# Patient Record
Sex: MALE | Race: OTHER | Employment: FULL TIME | URBAN - METROPOLITAN AREA
[De-identification: names, ages, dates, MRNs, and addresses within clinical notes are randomized per-mention and may not be internally consistent; named-entity substitution may affect disease eponyms.]

---

## 2019-09-05 ENCOUNTER — HOSPITAL ENCOUNTER (EMERGENCY)
Age: 39
Discharge: HOME | End: 2019-09-06
Payer: COMMERCIAL

## 2019-09-05 DIAGNOSIS — R03.0 ELEVATED BLOOD PRESSURE READING: Primary | ICD-10-CM

## 2019-09-05 PROCEDURE — 99281 EMR DPT VST MAYX REQ PHY/QHP: CPT

## 2019-09-06 VITALS
DIASTOLIC BLOOD PRESSURE: 104 MMHG | WEIGHT: 190 LBS | TEMPERATURE: 98.2 F | RESPIRATION RATE: 15 BRPM | SYSTOLIC BLOOD PRESSURE: 156 MMHG | HEART RATE: 65 BPM | HEIGHT: 67 IN | BODY MASS INDEX: 29.82 KG/M2 | OXYGEN SATURATION: 100 %

## 2019-09-06 NOTE — ED NOTES
Pt states that he needs a primary physician and if given one he can be discharged.      Mann Beth RN  09/05/19 9239

## 2019-09-06 NOTE — ED TRIAGE NOTES
Pt to ed with a friend who was requesting a test, pt sts he just wanted his health to be checked no complaint. It was explained to pt we are for emergency not general check ups.  Pt triaged using language line Kp Mendoza

## 2020-10-05 ENCOUNTER — HOSPITAL ENCOUNTER (EMERGENCY)
Age: 40
Discharge: HOME | End: 2020-10-05
Payer: COMMERCIAL

## 2020-10-05 ENCOUNTER — APPOINTMENT (OUTPATIENT)
Dept: CT IMAGING | Age: 40
End: 2020-10-05
Payer: COMMERCIAL

## 2020-10-05 VITALS
SYSTOLIC BLOOD PRESSURE: 135 MMHG | HEART RATE: 87 BPM | OXYGEN SATURATION: 98 % | TEMPERATURE: 98 F | RESPIRATION RATE: 16 BRPM | DIASTOLIC BLOOD PRESSURE: 87 MMHG

## 2020-10-05 PROCEDURE — 70450 CT HEAD/BRAIN W/O DYE: CPT

## 2020-10-05 PROCEDURE — 90471 IMMUNIZATION ADMIN: CPT | Performed by: PHYSICIAN ASSISTANT

## 2020-10-05 PROCEDURE — 4500000027

## 2020-10-05 PROCEDURE — 2500000003 HC RX 250 WO HCPCS: Performed by: PHYSICIAN ASSISTANT

## 2020-10-05 PROCEDURE — 90715 TDAP VACCINE 7 YRS/> IM: CPT | Performed by: PHYSICIAN ASSISTANT

## 2020-10-05 PROCEDURE — 6360000002 HC RX W HCPCS: Performed by: PHYSICIAN ASSISTANT

## 2020-10-05 PROCEDURE — 72125 CT NECK SPINE W/O DYE: CPT

## 2020-10-05 PROCEDURE — 6370000000 HC RX 637 (ALT 250 FOR IP): Performed by: PHYSICIAN ASSISTANT

## 2020-10-05 PROCEDURE — 99283 EMERGENCY DEPT VISIT LOW MDM: CPT

## 2020-10-05 RX ORDER — DIAPER,BRIEF,INFANT-TODD,DISP
EACH MISCELLANEOUS ONCE
Status: COMPLETED | OUTPATIENT
Start: 2020-10-05 | End: 2020-10-05

## 2020-10-05 RX ORDER — LIDOCAINE HYDROCHLORIDE 20 MG/ML
5 INJECTION, SOLUTION INFILTRATION; PERINEURAL ONCE
Status: COMPLETED | OUTPATIENT
Start: 2020-10-05 | End: 2020-10-05

## 2020-10-05 RX ADMIN — LIDOCAINE HYDROCHLORIDE 5 ML: 20 INJECTION, SOLUTION INFILTRATION; PERINEURAL at 17:50

## 2020-10-05 RX ADMIN — TETANUS TOXOID, REDUCED DIPHTHERIA TOXOID AND ACELLULAR PERTUSSIS VACCINE, ADSORBED 0.5 ML: 5; 2.5; 8; 8; 2.5 SUSPENSION INTRAMUSCULAR at 17:49

## 2020-10-05 RX ADMIN — BACITRACIN ZINC: 500 OINTMENT TOPICAL at 18:16

## 2020-10-05 ASSESSMENT — PAIN DESCRIPTION - PAIN TYPE: TYPE: ACUTE PAIN

## 2020-10-05 ASSESSMENT — PAIN SCALES - GENERAL
PAINLEVEL_OUTOF10: 6
PAINLEVEL_OUTOF10: 6

## 2020-10-05 NOTE — ED PROVIDER NOTES
As tele physician-in-triage, I performed a medical screening history on this patient with the use of a live telehealth program. Nursing staff may have been used as a surrogate for any part of the physical exam which may be unable to be performed by myself. HISTORY OF PRESENT ILLNESS  Michael Zamora Nurse is a 37 y.o. male was on the ground fixing something when the headboard fell onto his head. No anticoagulants. No neck pain. PHYSICAL EXAM  There were no vitals taken for this visit. On exam, the patient appears well-hydrated, well-nourished, and in no acute distress. Speech is clear. Breathing is unlabored. Mental status is normal. The patient has normal gait, moves all extremities, and is without facial droop. Comment: Please note this report has been produced using speech recognition software and may contain errors related to that system including errors in grammar, punctuation, and spelling, as well as words and phrases that may be inappropriate. If there are any questions or concerns please feel free to contact the dictating provider for clarification.       Danielle Napier DO  10/05/20 5054

## 2020-10-05 NOTE — ED PROVIDER NOTES
eMERGENCY dEPARTMENT eNCOUnter      PCP: No primary care provider on file. CHIEF COMPLAINT    Chief Complaint   Patient presents with    Head Injury     3-4 cm laceration to top of head, head of bed fell onto head from leaning against wall and pt sitting on floor, denies head injury, changes to vision, or dizziness, pt drove to ed      Pt was not seen by physician    Patient's primary language is [place] but he does speak enough English to get through [facility] plan of care and testing. Did offer him language line  which she declined at this time. I do believe we had sufficient communication. Did use  #[id number] to re-communicate with patient and to discuss head injury signs and symptoms, laceration repair    HPI    Michael Johns is a 37 y.o. male who presents with head injury and scalp laceration that occurred this afternoon. Patient was attempting to put a bed frame together. Was loosening a piece when the head of the bed frame fell down onto patient's head while he was on the ground. No loss of consciousness. He denies dizziness nausea vomiting acute vision changes. Denies neck pain or any other injuries. He is uncertain about his tetanus status. REVIEW OF SYSTEMS    Constitutional:  Denies fever, chills, weight loss or weakness   HENT:  Denies sore throat or ear pain   Cardiovascular:  Denies chest pain, palpitations   Respiratory:  Denies cough or shortness of breath    GI:  Denies abdominal pain, nausea, vomiting, or diarrhea  :  Denies any urinary symptoms   Musculoskeletal:  Denies back pain,   Skin:  Denies rash  Neurologic:  Denies focal weakness or sensory changes   Endocrine:  Denies polyuria or polydypsia   Lymphatic:  Denies swollen glands     All other review of systems are negative  See HPI and nursing notes for additional information     PAST MEDICAL AND SURGICAL HISTORY    History reviewed. No pertinent past medical history. History reviewed.  No pertinent surgical history. CURRENT MEDICATIONS        ALLERGIES    No Known Allergies    SOCIAL AND FAMILY HISTORY    Social History     Socioeconomic History    Marital status:      Spouse name: None    Number of children: None    Years of education: None    Highest education level: None   Occupational History    None   Social Needs    Financial resource strain: None    Food insecurity     Worry: None     Inability: None    Transportation needs     Medical: None     Non-medical: None   Tobacco Use    Smoking status: Never Smoker    Smokeless tobacco: Never Used   Substance and Sexual Activity    Alcohol use: Not Currently     Comment: occ    Drug use: Never    Sexual activity: None   Lifestyle    Physical activity     Days per week: None     Minutes per session: None    Stress: None   Relationships    Social connections     Talks on phone: None     Gets together: None     Attends [employer] service: None     Active member of club or organization: None     Attends meetings of clubs or organizations: None     Relationship status: None    Intimate partner violence     Fear of current or ex partner: None     Emotionally abused: None     Physically abused: None     Forced sexual activity: None   Other Topics Concern    None   Social History Narrative    None     History reviewed. No pertinent family history. PHYSICAL EXAM    VITAL SIGNS: BP (!) 148/98   Pulse 101   Temp 98 °F (36.7 °C) (Oral)   Resp 16   SpO2 98%    Constitutional:  Well developed, Well nourished  HENT:  Normocephalic,  PERRL. EOMI. Sclera clear. Conjunctiva normal, No discharge. Neck/Lymphatics: supple, no JVD, no swollen nodes  No midline cervical tenderness to palpation  Cardiovascular:  Normal heart rate, Normal rhythm, No murmurs  Respiratory:  Nonlabored breathing. Normal breath sounds, No wheezing  Abdomen: Bowel sounds normal, Soft, No tenderness, no masses.   Musculoskeletal: No edema, No tenderness, No cyanosis  Integument:  Warm, Dry  5 cm scalp laceration  Neurologic:  Alert & oriented , No focal deficits noted. Cranial nerves II through XII grossly intact. Finger to nose intact, rapid alternating movements intact. Normal gross motor coordination & motor strength bilateral upper and lower extremities. Sensation intact. Psychiatric:  Affect normal, Mood normal.       RADIOLOGY    Ct Head Wo Contrast    Result Date: 10/5/2020  EXAMINATION: CT OF THE HEAD WITHOUT CONTRAST  10/5/2020 4:33 pm TECHNIQUE: CT of the head was performed without the administration of intravenous contrast. Dose modulation, iterative reconstruction, and/or weight based adjustment of the mA/kV was utilized to reduce the radiation dose to as low as reasonably achievable. COMPARISON: None. HISTORY: ORDERING SYSTEM PROVIDED HISTORY: struck head TECHNOLOGIST PROVIDED HISTORY: Has a \"code stroke\" or \"stroke alert\" been called? ->No Reason for exam:->struck head Reason for Exam: struck head FINDINGS: BRAIN/VENTRICLES: There is no acute intracranial hemorrhage, mass effect or midline shift. No abnormal extra-axial fluid collection. The gray-white differentiation is maintained without evidence of an acute infarct. There is no evidence of hydrocephalus. ORBITS: The visualized portion of the orbits demonstrate no acute abnormality. SINUSES: The visualized paranasal sinuses and mastoid air cells demonstrate no acute abnormality. SOFT TISSUES/SKULL:  No acute abnormality of the visualized skull or soft tissues. No acute intracranial abnormality. Ct Cervical Spine Wo Contrast    Result Date: 10/5/2020  EXAMINATION: CT OF THE CERVICAL SPINE WITHOUT CONTRAST 10/5/2020 4:34 pm TECHNIQUE: CT of the cervical spine was performed without the administration of intravenous contrast. Multiplanar reformatted images are provided for review.  Dose modulation, iterative reconstruction, and/or weight based adjustment of the mA/kV was utilized to reduce the radiation dose to as low as reasonably achievable. COMPARISON: None. HISTORY: ORDERING SYSTEM PROVIDED HISTORY: injury TECHNOLOGIST PROVIDED HISTORY: Reason for exam:->injury Reason for Exam: injury FINDINGS: BONES/ALIGNMENT: There is no acute fracture or traumatic malalignment. DEGENERATIVE CHANGES: No significant degenerative changes. SOFT TISSUES: There is no prevertebral soft tissue swelling. No evidence for fracture or malalignment cervical spine       ________________________________________________________________________       Procedure Note - Lupe Hamilton PA-C      Laceration Repair Procedure Note    Indication: Skin Laceration    Procedure:   - Procedure explained, including risks and benefits explained to the patient who expressed understanding. All questions were answered. Verbal consent obtained. - The Wound was prepped and draped in the usual sterile fashion using Betadine and sterile saline.  - The wound is anesthetized using 2 % lidocaine, approximately 5 ml  - Wound was explored to it's depth,  no compromise of neurovascular structures, no foreign bodies. - Wound was irrigated with copious amounts of sterile saline and mechanically debrided utilizing sterile gauze. - The laceration was Closed with 4.0 and 5.0 vicryl rapide sutures, total number of 12 simple interrupted  - Hemostasis and good cosmesis was achieved. Blood loss minimal.  - The wound area was then dressed with Sterile nonstick dressing, sterile gauze, and tape. - Patient tolerated procedure well without complications. Total repaired wound length: 5cm    Discussed with Pt today:  I discussed possibility of infection, retained foreign body, tendon injury, nerve injury. Wound care and scar minimization education was provided. Instructions were given to return for increasing pain, redness, streaking, discharge, or any other worsening or worrisome concerns. Wound check in 48 hours.  Absorbable sutures    ________________________________________________________________________          ED COURSE & MEDICAL DECISION MAKING       Vital signs and nursing notes reviewed during ED course. Patient seen independently. Attending available throughout ED stay for consultation. All pertinent Lab data and radiographic results reviewed with patient at bedside. The patient and/or the family were informed of the results of any tests/labs/imaging, the treatment plan, and time was allotted to answer questions. Clinical  IMPRESSION    1. Injury of head, initial encounter    2. Laceration of scalp, initial encounter      Patient presents as above with head injury and scalp laceration. On my initial evaluation it appeared that we are able to communicate fairly well however was called back into the room because there appeared to be some confusion. Patient was declining tetanus and wanted to have the area glued. He was in a hurry to leave the department. I did use  to stressed the importance of tetanus update as well as to discuss laceration repair. Ultimately patient did elect to receive tetanus and to have the sutures placed. Laceration repaired as above. Imaging as above. Updated patient on signs symptoms of head injury when to return to the ED. Educated on suture care and removal. Patient advised to return to the ED at anytime for new or worsening symptoms. Patient verbalized understanding and agreement with the plan of care. Comment: Please note this report has been produced using speech recognition software and may contain errors related to that system including errors in grammar, punctuation, and spelling, as well as words and phrases that may be inappropriate. If there are any questions or concerns please feel free to contact the dictating provider for clarification.         Roc Rick PA-C  10/08/20 8527

## 2022-11-16 ENCOUNTER — HOSPITAL ENCOUNTER (EMERGENCY)
Age: 42
Discharge: HOME | End: 2022-11-16

## 2022-11-16 VITALS
SYSTOLIC BLOOD PRESSURE: 164 MMHG | TEMPERATURE: 97.8 F | DIASTOLIC BLOOD PRESSURE: 108 MMHG | OXYGEN SATURATION: 99 % | RESPIRATION RATE: 18 BRPM | WEIGHT: 180 LBS | HEART RATE: 70 BPM | BODY MASS INDEX: 28.19 KG/M2

## 2022-11-16 DIAGNOSIS — R25.2 MUSCLE CRAMPING: ICD-10-CM

## 2022-11-16 DIAGNOSIS — R03.0 ELEVATED BLOOD PRESSURE READING WITHOUT DIAGNOSIS OF HYPERTENSION: Primary | ICD-10-CM

## 2022-11-16 LAB
ALBUMIN SERPL-MCNC: 4.5 GM/DL (ref 3.4–5)
ALP BLD-CCNC: 71 IU/L (ref 40–129)
ALT SERPL-CCNC: 31 U/L (ref 10–40)
ANION GAP SERPL CALCULATED.3IONS-SCNC: 9 MMOL/L (ref 4–16)
AST SERPL-CCNC: 29 IU/L (ref 15–37)
BASOPHILS ABSOLUTE: 0 K/CU MM
BASOPHILS RELATIVE PERCENT: 0.7 % (ref 0–1)
BILIRUB SERPL-MCNC: 0.3 MG/DL (ref 0–1)
BUN BLDV-MCNC: 13 MG/DL (ref 6–23)
CALCIUM SERPL-MCNC: 9.6 MG/DL (ref 8.3–10.6)
CHLORIDE BLD-SCNC: 100 MMOL/L (ref 99–110)
CO2: 28 MMOL/L (ref 21–32)
CREAT SERPL-MCNC: 0.9 MG/DL (ref 0.9–1.3)
DIFFERENTIAL TYPE: ABNORMAL
EOSINOPHILS ABSOLUTE: 0.1 K/CU MM
EOSINOPHILS RELATIVE PERCENT: 2.1 % (ref 0–3)
GFR SERPL CREATININE-BSD FRML MDRD: >60 ML/MIN/1.73M2
GLUCOSE BLD-MCNC: 90 MG/DL (ref 70–99)
HCT VFR BLD CALC: 43.9 % (ref 42–52)
HEMOGLOBIN: 15.1 GM/DL (ref 13.5–18)
IMMATURE NEUTROPHIL %: 0.2 % (ref 0–0.43)
LYMPHOCYTES ABSOLUTE: 2.4 K/CU MM
LYMPHOCYTES RELATIVE PERCENT: 54.7 % (ref 24–44)
MAGNESIUM: 2.1 MG/DL (ref 1.8–2.4)
MCH RBC QN AUTO: 26.3 PG (ref 27–31)
MCHC RBC AUTO-ENTMCNC: 34.4 % (ref 32–36)
MCV RBC AUTO: 76.5 FL (ref 78–100)
MONOCYTES ABSOLUTE: 0.5 K/CU MM
MONOCYTES RELATIVE PERCENT: 12.1 % (ref 0–4)
NUCLEATED RBC %: 0 %
PDW BLD-RTO: 13.8 % (ref 11.7–14.9)
PLATELET # BLD: 197 K/CU MM (ref 140–440)
PMV BLD AUTO: 11.8 FL (ref 7.5–11.1)
POTASSIUM SERPL-SCNC: 4.2 MMOL/L (ref 3.5–5.1)
RBC # BLD: 5.74 M/CU MM (ref 4.6–6.2)
SEGMENTED NEUTROPHILS ABSOLUTE COUNT: 1.3 K/CU MM
SEGMENTED NEUTROPHILS RELATIVE PERCENT: 30.2 % (ref 36–66)
SODIUM BLD-SCNC: 137 MMOL/L (ref 135–145)
TOTAL CK: 199 IU/L (ref 38–174)
TOTAL IMMATURE NEUTOROPHIL: 0.01 K/CU MM
TOTAL NUCLEATED RBC: 0 K/CU MM
TOTAL PROTEIN: 7.7 GM/DL (ref 6.4–8.2)
WBC # BLD: 4.4 K/CU MM (ref 4–10.5)

## 2022-11-16 PROCEDURE — 85025 COMPLETE CBC W/AUTO DIFF WBC: CPT

## 2022-11-16 PROCEDURE — 83735 ASSAY OF MAGNESIUM: CPT

## 2022-11-16 PROCEDURE — 6360000002 HC RX W HCPCS: Performed by: PHYSICIAN ASSISTANT

## 2022-11-16 PROCEDURE — 82550 ASSAY OF CK (CPK): CPT

## 2022-11-16 PROCEDURE — 2580000003 HC RX 258: Performed by: PHYSICIAN ASSISTANT

## 2022-11-16 PROCEDURE — 99284 EMERGENCY DEPT VISIT MOD MDM: CPT

## 2022-11-16 PROCEDURE — 80053 COMPREHEN METABOLIC PANEL: CPT

## 2022-11-16 PROCEDURE — 96374 THER/PROPH/DIAG INJ IV PUSH: CPT

## 2022-11-16 RX ORDER — KETOROLAC TROMETHAMINE 30 MG/ML
30 INJECTION, SOLUTION INTRAMUSCULAR; INTRAVENOUS ONCE
Status: COMPLETED | OUTPATIENT
Start: 2022-11-16 | End: 2022-11-16

## 2022-11-16 RX ORDER — 0.9 % SODIUM CHLORIDE 0.9 %
1000 INTRAVENOUS SOLUTION INTRAVENOUS ONCE
Status: COMPLETED | OUTPATIENT
Start: 2022-11-16 | End: 2022-11-16

## 2022-11-16 RX ADMIN — SODIUM CHLORIDE 1000 ML: 9 INJECTION, SOLUTION INTRAVENOUS at 09:14

## 2022-11-16 RX ADMIN — KETOROLAC TROMETHAMINE 30 MG: 30 INJECTION, SOLUTION INTRAMUSCULAR at 10:05

## 2022-11-16 ASSESSMENT — PAIN DESCRIPTION - FREQUENCY: FREQUENCY: INTERMITTENT

## 2022-11-16 ASSESSMENT — PAIN SCALES - GENERAL
PAINLEVEL_OUTOF10: 0
PAINLEVEL_OUTOF10: 6

## 2022-11-16 ASSESSMENT — PAIN DESCRIPTION - LOCATION: LOCATION: LEG;ARM;SHOULDER

## 2022-11-16 ASSESSMENT — PAIN DESCRIPTION - PAIN TYPE: TYPE: ACUTE PAIN

## 2022-11-16 ASSESSMENT — PAIN DESCRIPTION - ORIENTATION: ORIENTATION: LEFT

## 2022-11-16 ASSESSMENT — PAIN DESCRIPTION - DESCRIPTORS: DESCRIPTORS: CRAMPING

## 2022-11-16 NOTE — ED PROVIDER NOTES
EMERGENCY DEPARTMENT ENCOUNTER    [facility] EMERGENCY DEPARTMENT        TRIAGE CHIEF COMPLAINT:   Other (pt states that he has had a \"cramp\" in his left arm and left leg; happened 3 times over the past 2 weeks)      YAAKOV Blackwell is a 38 y.o. male that presents with concern for \"cramping. \"  Context is patient states over the last 2 weeks, he has had 3 different episodes of \"sharp cramps\" to his left hand and left thigh that last about 15 minutes. States that typically occurs when he is being active at work or at home and will resolve. Denying any trauma or injury prior to onset of symptoms. No associated neck or back pain. Denying any numbness tingling or weakness in the left upper or lower extremities. No chest pain or shortness of breath. Denies any mid back pain or tearing/ripping sensation. He is otherwise a healthy 78-year-old male, no underlying medical conditions that he knows of. No new foods medications. Denies any excessive exercise or increased caffeine intake or use of preworkout or energy drinks. No associated palpitations dizziness lightheadedness or headache. At this time, patient is endorsing \"soreness\" to his left hand and left thigh, 6/10 as he did have a muscle cramp this morning prior to ED arrival.  Saddle paresthesia. No bowel incontinence or bladder retention. No history of IV drug use or diabetes. No recent long travel hospitalizations, surgeries. No previous history of DVT/PE. Not noted any swelling in the affected extremities. No known history of bleeding or clotting disorders.     ROS:  General:  No fevers, no chills   Cardiovascular:  No chest pain, no palpitations  Respiratory:  No shortness of breath, no cough, no wheezing  Gastrointestinal:  No pain, no nausea, no vomiting, no diarrhea  Musculoskeletal:  See HPI  Skin:  No rash, no pruritis, no easy bruising  Neurologic:  No speech problems, no headache, no extremity numbness, no extremity tingling, no extremity weakness  Psychiatric:  No anxiety  Genitourinary:  No dysuria, no hematuria  Extremities: See HPI    History reviewed. No pertinent past medical history. History reviewed. No pertinent surgical history. History reviewed. No pertinent family history. Social History     Socioeconomic History    Marital status:      Spouse name: Not on file    Number of children: Not on file    Years of education: Not on file    Highest education level: Not on file   Occupational History    Not on file   Tobacco Use    Smoking status: Never    Smokeless tobacco: Never   Vaping Use    Vaping Use: Never used   Substance and Sexual Activity    Alcohol use: Not Currently     Comment: occ    Drug use: Never    Sexual activity: Not on file   Other Topics Concern    Not on file   Social History Narrative    Not on file     Social Determinants of Health     Financial Resource Strain: Not on file   Food Insecurity: Not on file   Transportation Needs: Not on file   Physical Activity: Not on file   Stress: Not on file   Social Connections: Not on file   Intimate Partner Violence: Not on file   Housing Stability: Not on file     No current facility-administered medications for this encounter. No current outpatient medications on file. No Known Allergies    Nursing Notes Reviewed  PHYSICAL EXAM    VITAL SIGNS: BP (!) 164/108   Pulse 70   Temp 97.8 °F (36.6 °C) (Oral)   Resp 18   Wt 180 lb (81.6 kg)   SpO2 99%   BMI 28.19 kg/m²    Constitutional:  Well developed, Well nourished, In no acute distress  Head:  Normocephalic, Atraumatic  Eyes:  EOMI. Sclera clear. Conjunctiva normal, No discharge. Neck/Lymphatics: Supple, no JVD   Cardiovascular:  RRR, Normal S1 &   Peripheral Vascular: Distal pulses 2+, Capillary refill <2seconds  Respiratory:  Respirations nonnlabored, Clear to auscultation bilaterally, No retractions  Abdomen:   Bowel sounds normal in all quadrants, Soft, Non tender/Nondistended, No palpable abdominal masses. Musculoskeletal: No edema, No tenderness, No cyanosis, 5/5 strength bilaterally, BUE/BLE symmetrical without atrophy or deformities. No pitting edema of the left upper or lower extremities. Compartments are soft throughout the extremities. No wrist drop, no foot drop. Radial pulse 2+. 5/5 throughout. No hyperreflexia. Full range of motion throughout the left upper and lower extremity without pain or deficits. Unable to reproduce any tenderness palpation to left upper or lower extremity soft tissue or joints. No joint effusions. No joint redness or warmth. Integument:  Warm, Dry, Intact, Skin turgor and texture normal  Neurologic:  Alert & oriented x3 , No focal deficits noted. No slurred speech. No facial droop. Normal gross motor coordination & motor strength bilateral upper and lower extremities.   No gait ataxia  Psychiatric:  Affect appropriate      I have reviewed and interpreted all of the currently available lab results from this visit (if applicable):  Results for orders placed or performed during the hospital encounter of 11/16/22   CBC with Auto Differential   Result Value Ref Range    WBC 4.4 4.0 - 10.5 K/CU MM    RBC 5.74 4.6 - 6.2 M/CU MM    Hemoglobin 15.1 13.5 - 18.0 GM/DL    Hematocrit 43.9 42 - 52 %    MCV 76.5 (L) 78 - 100 FL    MCH 26.3 (L) 27 - 31 PG    MCHC 34.4 32.0 - 36.0 %    RDW 13.8 11.7 - 14.9 %    Platelets 952 955 - 204 K/CU MM    MPV 11.8 (H) 7.5 - 11.1 FL    Differential Type AUTOMATED DIFFERENTIAL     Segs Relative 30.2 (L) 36 - 66 %    Lymphocytes % 54.7 (H) 24 - 44 %    Monocytes % 12.1 (H) 0 - 4 %    Eosinophils % 2.1 0 - 3 %    Basophils % 0.7 0 - 1 %    Segs Absolute 1.3 K/CU MM    Lymphocytes Absolute 2.4 K/CU MM    Monocytes Absolute 0.5 K/CU MM    Eosinophils Absolute 0.1 K/CU MM    Basophils Absolute 0.0 K/CU MM    Nucleated RBC % 0.0 %    Total Nucleated RBC 0.0 K/CU MM    Total Immature Neutrophil 0.01 K/CU MM    Immature Neutrophil % 0.2 0 - 0.43 %   Comprehensive Metabolic Panel   Result Value Ref Range    Sodium 137 135 - 145 MMOL/L    Potassium 4.2 3.5 - 5.1 MMOL/L    Chloride 100 99 - 110 mMol/L    CO2 28 21 - 32 MMOL/L    BUN 13 6 - 23 MG/DL    Creatinine 0.9 0.9 - 1.3 MG/DL    Est, Glom Filt Rate >60 >60 mL/min/1.73m2    Glucose 90 70 - 99 MG/DL    Calcium 9.6 8.3 - 10.6 MG/DL    Albumin 4.5 3.4 - 5.0 GM/DL    Total Protein 7.7 6.4 - 8.2 GM/DL    Total Bilirubin 0.3 0.0 - 1.0 MG/DL    ALT 31 10 - 40 U/L    AST 29 15 - 37 IU/L    Alkaline Phosphatase 71 40 - 129 IU/L    Anion Gap 9 4 - 16   CK   Result Value Ref Range    Total  (H) 38 - 174 IU/L   Magnesium   Result Value Ref Range    Magnesium 2.1 1.8 - 2.4 mg/dl        Radiographs (if obtained):  [] The following radiograph was interpreted by myself in the absence of a radiologist:   [] Radiologist's Report Reviewed:  No orders to display            Chart review shows recent radiographs:  No results found. ED COURSE & MEDICAL DECISION MAKING       Vital signs and nursing notes reviewed during ED course. I have independently evaluated this patient . Supervising physician - Dr. Tres Smith - was present in ED and available for consultation throughout entirety of patient's care. All pertinent Lab data and radiographic results reviewed with patient at bedside. The patient and/or the family were informed of the results of any tests/labs/imaging, the treatment plan, and time was allotted to answer questions. Clinical Impression:  1. Elevated blood pressure reading without diagnosis of hypertension    2. Muscle cramping        Patient presents with concern for intermittent episodes of left-sided upper and lower extremity cramping. My exam, pleasant nontoxic 57-year-old male, ambulating without assistance on the emergency department. Currently asymptomatic for any cramping complaints.   Noted elevated blood pressure 182/117 without diagnosed history of hypertension. Denying any chest pain shortness of breath dizziness lightheadedness or headache at this time. Unremarkable cardiopulmonary exam.  Ab soft nontender. Symmetrical pulses and compartments in the upper and lower extremities. No palpable soft tissue abnormalities or localized joint tenderness in the left upper or lower extremities. No joint effusions. No hyperreflexia. Patient is neurovascular intact and equal in the left upper and lower extremity. Patient given IV fluids and Toradol. Labs today reassuring. CBC without significant derangement. CMP with normal electrolytes and renal function. CK is detectable at 199 without concerning range for acute rhabdomyolysis. Normal magnesium. At this time, unclear exact etiology for patient's presenting symptoms. No evidence of infectious process or other neurovascular compromise of the left upper/lower extremity. On reassessment, patient is now pain-free and ready for discharge home. Patient is otherwise low risk for acute DVT per Wells/PERC criteria. We discussed pushing clear fluids at home and encouraging oral hydration and to establish care with family doctor for further outpatient assessment. Low clinical suspicion for cauda equina, acute epidural abscess/hematoma, rhabdomyolysis, severe electrolyte disturbance, ischemic limb, compartment syndrome, intra-articular joint infection/septic arthritis, DVT, osteomyelitis, arterial/neurologic injury, necrotizing fasciitis, or infected/rapidly expanding hematoma. Patient is discharged in stable condition. Patient does not have PCP so I have given him/her doctor of the day contact information and encourage him/her to establish care and followup in the next 1-2 days. Return precautions back to the ED discussed for any new or worsening symptoms. Patient is noted to have elevated blood pressure in the ED without known/diagnosed history of hypertension.  He/she is not currently on antihypertensives. Currently denying any chest pain, shortness of breath, headache, dizziness or other signs or symptoms concerning for hypertensive emergency/crisis requiring rapid blood pressure lowering at this time. Had a lengthy discussion with patient regarding blood pressure management and that poorly controlled hypertension can lead to worsening medical conditions and even death including ACS/MI, dissection, stroke, end organ injury, etc.  Strongly encouraged to follow up with PCP and/or cardiologist for blood pressure rechecks and antihypertensive medications. Diagnosis and plan discussed in detail with patient who understands and agrees. Patient agrees to return emergency department if symptoms worsen or any new symptoms develop. Disposition referral (if applicable):  No follow-up provider specified. Disposition medications (if applicable): There are no discharge medications for this patient.         (Please note that portions of this note may have been completed with a voice recognition program. Efforts were made to edit the dictations but occasionally words are mis-transcribed.)          Natalie Amado PA-C  11/16/22 1467

## 2022-11-16 NOTE — ED NOTES
Susan Schreiber [place] notified of patient's blood pressure. OK's discharge.        Jeff Hawk RN  11/16/22 9238

## 2022-11-16 NOTE — ED NOTES
Reviewed discharge instructions with patient and family. All voice understanding/deny questions. Wheelchair offered, declines. Ambulates without difficulty.        Shun Bean RN  11/16/22 1298